# Patient Record
Sex: MALE | Race: WHITE | NOT HISPANIC OR LATINO | Employment: PART TIME | URBAN - METROPOLITAN AREA
[De-identification: names, ages, dates, MRNs, and addresses within clinical notes are randomized per-mention and may not be internally consistent; named-entity substitution may affect disease eponyms.]

---

## 2019-03-07 ENCOUNTER — THERAPY VISIT (OUTPATIENT)
Dept: PHYSICAL THERAPY | Facility: CLINIC | Age: 64
End: 2019-03-07
Payer: COMMERCIAL

## 2019-03-07 DIAGNOSIS — M54.50 BILATERAL LOW BACK PAIN WITHOUT SCIATICA: ICD-10-CM

## 2019-03-07 PROCEDURE — 97140 MANUAL THERAPY 1/> REGIONS: CPT | Mod: GP | Performed by: PHYSICAL THERAPIST

## 2019-03-07 PROCEDURE — 97161 PT EVAL LOW COMPLEX 20 MIN: CPT | Mod: GP | Performed by: PHYSICAL THERAPIST

## 2019-03-07 PROCEDURE — 97110 THERAPEUTIC EXERCISES: CPT | Mod: GP | Performed by: PHYSICAL THERAPIST

## 2019-03-07 NOTE — LETTER
[facility]  [address]  [address]  [place] [address]  [phone]    2019    Re: Nir Mendenhall   :   1955  MRN:  [id number]   REFERRING PHYSICIAN:   Aiden Salguero    [facility]    Date of Initial Evaluation: 3/7/19  Visits:  Rxs Used: 1  Reason for Referral:  Bilateral low back pain without sciatica    EVALUATION SUMMARY    [facility] Initial Evaluation  Subjective:  The history is provided by the patient. No  was used.   Nir Mendenhall is a 63 year old male with a lumbar condition.  Condition occurred with:  Repetition/overuse.  Condition occurred: for unknown reasons.  This is a chronic condition  2019.  Patient reports pain:  Central lumbar spine.    Pain is described as aching and is intermittent and reported as 4/10.  Associated symptoms:  Loss of motion/stiffness and loss of strength. Pain is worse in the P.M. (difficulty sleeping due to back pain).  Symptoms are exacerbated by lifting, walking and standing and relieved by rest.  General health as reported by patient is good.  Pertinent medical history includes:  None.  Medical allergies: no.  Other surgeries include:  Other.  Current medications:  None as reported by the patient.  Current occupation is Pt works in [employer],  the cost..  Patient is working in normal job with restrictions.      Barriers include:  None as reported by the patient.  Red flags:  None as reported by the patient.  Goal: decreased back pain during sleeping                      [place] Lumbar Evaluation    Posture:  Sitting: poor  Standing: poor  Lordosis: Reduced  Lateral Shift: no  Correction of Posture: better    Movement Loss:  Flexion (Flex): min  Extension (EXT): min         Nir Mendenhall   :   1955      Test Movements:  FIS: During: no effect  After: no effect    Repeat FIS: During: no effect  After: no effect   Mechanical Response: IncROM  EIS: During: no effect  After: no effect    Repeat EIS: During: no effect  After: no effect  Mechanical Response: no effect  TALI: During: decreases  After: better    Repeat TALI: During: decreases  After: better      Conclusion: derangement  Principle of Treatment:  Flexion: pt responding to repeated flexion of the lumbar spine. encouraged to perform flexion in sitting stretch every 2-3 hours during the day. use of lumbar roll to improve posture when sitting.    Assessment/Plan:    Patient is a 63 year old male with lumbar complaints.    Patient has the following significant findings with corresponding treatment plan.                Diagnosis 1:  Low Back Pain, Chronic  Pain -  hot/cold therapy, manual therapy, self management, education, directional preference exercise and home program  Decreased ROM/flexibility - manual therapy and therapeutic exercise  Decreased joint mobility - manual therapy and therapeutic exercise  Impaired posture - neuro re-education    Previous and current functional limitations:  (See Goal Flow Sheet for this information)    Short term and Long term goals: (See Goal Flow Sheet for this information)     Communication ability:  Patient appears to be able to clearly communicate and understand verbal and written communication and follow directions correctly.  Treatment Explanation - The following has been discussed with the patient:   RX ordered/plan of care  Anticipated outcomes  Possible risks and side effects  This patient would benefit from PT intervention to resume normal activities.   Rehab potential is good.    Frequency:  1 X week, once daily  Duration:  for 6 weeks  Discharge Plan:  Achieve all LTG.  Independent in home treatment program.  Reach maximal therapeutic benefit.    Thank you for your referral.    INQUIRIES  Therapist: Elizabeth Navarro, PT  [facility] [place] [facility]  [address]  [address]  [place]  [address]  Phone: [phone]  Fax: [phone]

## 2019-03-07 NOTE — PROGRESS NOTES
[facility] Initial Evaluation  Subjective:  The history is provided by the patient. No  was used.   Nir Mendenhall is a 63 year old male with a lumbar condition.  Condition occurred with:  Repetition/overuse.  Condition occurred: for unknown reasons.  This is a chronic condition  1/16/2019.    Patient reports pain:  Central lumbar spine.    Pain is described as aching and is intermittent and reported as 4/10.  Associated symptoms:  Loss of motion/stiffness and loss of strength. Pain is worse in the P.M. (difficulty sleeping due to back pain).  Symptoms are exacerbated by lifting, walking and standing and relieved by rest.          General health as reported by patient is good.  Pertinent medical history includes:  None.  Medical allergies: no.  Other surgeries include:  Other.  Current medications:  None as reported by the patient.  Current occupation is Pt works in [employer],  the cost..  Patient is working in normal job with restrictions.      Barriers include:  None as reported by the patient.    Red flags:  None as reported by the patient.    Goal: decreased back pain during sleeping                    Objective:  System    Physical Exam      [place] Lumbar Evaluation    Posture:  Sitting: poor  Standing: poor  Lordosis: Reduced  Lateral Shift: no  Correction of Posture: better    Movement Loss:  Flexion (Flex): min  Extension (EXT): min      Test Movements:  FIS: During: no effect  After: no effect    Repeat FIS: During: no effect  After: no effect  Mechanical Response: IncROM  EIS: During: no effect  After: no effect    Repeat EIS: During: no effect  After: no effect  Mechanical Response: no effect  TALI: During: decreases  After: better    Repeat TALI: During: decreases  After: better            Conclusion: derangement  Principle of Treatment:    Flexion: pt responding to repeated flexion of the lumbar spine. encouraged to perform flexion in sitting stretch  every 2-3 hours during the day. use of lumbar roll to improve posture when sitting.                                             ROS    Assessment/Plan:    Patient is a 63 year old male with lumbar complaints.    Patient has the following significant findings with corresponding treatment plan.                Diagnosis 1:  Low Back Pain, Chronic  Pain -  hot/cold therapy, manual therapy, self management, education, directional preference exercise and home program  Decreased ROM/flexibility - manual therapy and therapeutic exercise  Decreased joint mobility - manual therapy and therapeutic exercise  Impaired posture - neuro re-education    1) Clinical presentation characteristics are:   Stable/Uncomplicated.  2) Decision-Making    Low complexity using standardized patient assessment instrument and/or measureable assessment of functional outcome.  Cumulative Therapy Evaluation is: Low complexity.    Previous and current functional limitations:  (See Goal Flow Sheet for this information)    Short term and Long term goals: (See Goal Flow Sheet for this information)     Communication ability:  Patient appears to be able to clearly communicate and understand verbal and written communication and follow directions correctly.  Treatment Explanation - The following has been discussed with the patient:   RX ordered/plan of care  Anticipated outcomes  Possible risks and side effects  This patient would benefit from PT intervention to resume normal activities.   Rehab potential is good.    Frequency:  1 X week, once daily  Duration:  for 6 weeks  Discharge Plan:  Achieve all LTG.  Independent in home treatment program.  Reach maximal therapeutic benefit.    Please refer to the daily flowsheet for treatment today, total treatment time and time spent performing 1:1 timed codes.

## 2019-05-06 PROBLEM — M54.50 BILATERAL LOW BACK PAIN WITHOUT SCIATICA: Status: RESOLVED | Noted: 2019-03-07 | Resolved: 2019-05-06

## 2019-05-06 NOTE — PROGRESS NOTES
Pt last seen in PT 03/07/2019.  Prior to being seen, he had multiple short notice cancellations and and a no show.  Since being seen, he has no showed again with no further appts scheduled.  Consider note dated 03/07 to serve as final summary.

## 2019-07-01 ENCOUNTER — ANCILLARY PROCEDURE (OUTPATIENT)
Dept: GENERAL RADIOLOGY | Facility: CLINIC | Age: 64
End: 2019-07-01
Attending: PEDIATRICS
Payer: COMMERCIAL

## 2019-07-01 ENCOUNTER — OFFICE VISIT (OUTPATIENT)
Dept: ORTHOPEDICS | Facility: CLINIC | Age: 64
End: 2019-07-01
Payer: COMMERCIAL

## 2019-07-01 VITALS
DIASTOLIC BLOOD PRESSURE: 82 MMHG | WEIGHT: 203 LBS | BODY MASS INDEX: 30.77 KG/M2 | SYSTOLIC BLOOD PRESSURE: 118 MMHG | HEIGHT: 68 IN

## 2019-07-01 DIAGNOSIS — R07.81 RIB PAIN ON RIGHT SIDE: ICD-10-CM

## 2019-07-01 DIAGNOSIS — R07.81 RIB PAIN ON RIGHT SIDE: Primary | ICD-10-CM

## 2019-07-01 PROCEDURE — 71101 X-RAY EXAM UNILAT RIBS/CHEST: CPT | Mod: RT

## 2019-07-01 PROCEDURE — 99203 OFFICE O/P NEW LOW 30 MIN: CPT | Performed by: PEDIATRICS

## 2019-07-01 RX ORDER — ESCITALOPRAM OXALATE 20 MG/1
TABLET ORAL
COMMUNITY

## 2019-07-01 RX ORDER — METOPROLOL TARTRATE 25 MG/1
TABLET, FILM COATED ORAL
Refills: 3 | COMMUNITY
Start: 2019-01-16

## 2019-07-01 ASSESSMENT — MIFFLIN-ST. JEOR: SCORE: 1685.3

## 2019-07-01 NOTE — PROGRESS NOTES
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 He was carrying some items out of the garage and he tripped and fell onto it yesterday. Pain over the right lower ribs. He states he has had difficulty with deep breathing.  Sleeping was difficult due to being unable to get comfortable.  No abdominal pain, no difficulty breathing.    Injury: fall onto object on right side     Location of Pain: right, side ribs   Duration of Pain: 1 day(s)  Rating of Pain at worst: 9/10  Rating of Pain Currently: 5/10  Symptoms are better with: rest  Symptoms are worse with: twisting, deep breathing, coughing   Additional Features:   Positive: none   Negative: swelling, bruising, popping, grinding, catching, locking, instability, paresthesias, numbness, weakness, pain in other joints and systemic symptoms  Other evaluation and/or treatments so far consists of: nothing  Prior History of related problems: denies     Social History:  for construction company     Review of Systems  Skin: no bruising, no swelling  Musculoskeletal: as above  Neurologic: no numbness, paresthesias  Remainder of review of systems is negative including constitutional, CV, pulmonary, GI, except as noted in HPI or medical history.    Patient's current problem list, past medical and surgical history, and family history were reviewed.    Patient Active Problem List   Diagnosis   (none) - all problems resolved or deleted     Past Medical History:   Diagnosis Date     Congestive heart failure, unspecified      Past Surgical History:   Procedure Laterality Date     CARDIAC SURGERY  September 2008    angioplasty     HERNIA REPAIR  1978     ORTHOPEDIC SURGERY  1998    torn maniscus     Objective  /82   Ht 1.727 m (5' 8\")   Wt 92.1 kg (203 lb)   BMI 30.87 kg/m      GENERAL APPEARANCE: healthy, alert and no distress   GAIT: " NORMAL  SKIN: no suspicious lesions or rashes  HEENT: Sclera clear, anicteric  CV: good peripheral pulses  RESP: Breathing not labored  NEURO: Normal strength and tone, mentation intact and speech normal  PSYCH:  mentation appears normal and affect normal/bright    Chest/Thoracic exam:  Inspection:     no visible deformity, swelling, bruising    Tender:     Right anterior ribs    Lungs: CTAB without wheezine  Abdomen: soft, non tender    Radiology  I ordered, visualized and reviewed these images with the patient  Xr Ribs & Chest Rt G/e 3vw  Result Date: 7/1/2019  RIGHT RIBS AND CHEST, THREE VIEWS 7/1/2019 12:52 PM HISTORY: Rib pain on right side. COMPARISON: None.   IMPRESSION: PA chest shows a small amount of platelike atelectasis or scarring in the lateral left lung base, and the remainder of the lungs are clear. Tortuous thoracic aorta and normal heart size. Right rib detail views are unremarkable.    Assessment:  1. Rib pain on right side      We discussed these other possible diagnosis: right rib contusion.  Can not rule out non-displaced rib fracture.  Discussed supportive care including rest, ice, OTC medications (reviewed dosing, do not take Ibuprofen with Naproxen).  Follow up in 3-4 weeks for persistent symptoms, could consider repeat x-rays to evaluate for fracture healing.  Could also consider physical therapy for persistent pain.  - Recommend follow up with PCP given CXR read of atelectasis/scarring lung base.  - Discussed concerning signs and symptoms that would warrant urgent care or ER evaluations.    Plan:  - Today's Plan of Care:  Referral to primary care following the radiologist report on the chest x ray.   Topical Treatments: Ice or Heat, begin with ice.  Ok to alternate to heat after a few days if needed  Over the counter medication: Acetaminophen (Tylenol) 1000mg every 6 hours with food (Maximum of 3000mg/day)  Ibuprofen (Advil) maximum of 800mg three times a day with food  Naproxen (Aleve)  maximum of 440mg two times a day with food  Observe and monitor    -We also discussed other future treatment options:  X ray of the chest or Rehab: Physical Therapy    Follow Up: 3-6 weeks or PRN    Concerning signs and symptoms were reviewed.  The patient expressed understanding of this management plan and all questions were answered at this time.    Linda Delvalle MD CAQ  Primary Care Sports Medicine  [facility]

## 2019-07-01 NOTE — LETTER
7/1/2019         RE: Nir Mendenhall  [address] [place] [address]        Dear Colleague,    Thank you for referring your patient, Nir Mendenhall, to the [facility] [place]. Please see a copy of my visit note below.    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 He was carrying some items out of the garage and he tripped and fell onto it yesterday. Pain over the right lower ribs. He states he has had difficulty with deep breathing.  Sleeping was difficult due to being unable to get comfortable.  No abdominal pain, no difficulty breathing.    Injury: fall onto object on right side     Location of Pain: right, side ribs   Duration of Pain: 1 day(s)  Rating of Pain at worst: 9/10  Rating of Pain Currently: 5/10  Symptoms are better with: rest  Symptoms are worse with: twisting, deep breathing, coughing   Additional Features:   Positive: none   Negative: swelling, bruising, popping, grinding, catching, locking, instability, paresthesias, numbness, weakness, pain in other joints and systemic symptoms  Other evaluation and/or treatments so far consists of: nothing  Prior History of related problems: denies     Social History:  for construction company     Review of Systems  Skin: no bruising, no swelling  Musculoskeletal: as above  Neurologic: no numbness, paresthesias  Remainder of review of systems is negative including constitutional, CV, pulmonary, GI, except as noted in HPI or medical history.    Patient's current problem list, past medical and surgical history, and family history were reviewed.    Patient Active Problem List   Diagnosis   (none) - all problems resolved or deleted     Past Medical History:   Diagnosis Date     Congestive heart failure, unspecified      Past Surgical History:   Procedure Laterality Date     CARDIAC  "SURGERY  September 2008    angioplasty     HERNIA REPAIR  1978     ORTHOPEDIC SURGERY  1998    torn maniscus     Objective  /82   Ht 1.727 m (5' 8\")   Wt 92.1 kg (203 lb)   BMI 30.87 kg/m       GENERAL APPEARANCE: healthy, alert and no distress   GAIT: NORMAL  SKIN: no suspicious lesions or rashes  HEENT: Sclera clear, anicteric  CV: good peripheral pulses  RESP: Breathing not labored  NEURO: Normal strength and tone, mentation intact and speech normal  PSYCH:  mentation appears normal and affect normal/bright    Chest/Thoracic exam:  Inspection:     no visible deformity, swelling, bruising    Tender:     Right anterior ribs    Lungs: CTAB without wheezine  Abdomen: soft, non tender    Radiology  I ordered, visualized and reviewed these images with the patient  Xr Ribs & Chest Rt G/e 3vw  Result Date: 7/1/2019  RIGHT RIBS AND CHEST, THREE VIEWS 7/1/2019 12:52 PM HISTORY: Rib pain on right side. COMPARISON: None.   IMPRESSION: PA chest shows a small amount of platelike atelectasis or scarring in the lateral left lung base, and the remainder of the lungs are clear. Tortuous thoracic aorta and normal heart size. Right rib detail views are unremarkable.    Assessment:  1. Rib pain on right side      We discussed these other possible diagnosis: right rib contusion.  Can not rule out non-displaced rib fracture.  Discussed supportive care including rest, ice, OTC medications (reviewed dosing, do not take Ibuprofen with Naproxen).  Follow up in 3-4 weeks for persistent symptoms, could consider repeat x-rays to evaluate for fracture healing.  Could also consider physical therapy for persistent pain.  - Recommend follow up with PCP given CXR read of atelectasis/scarring lung base.    Plan:  - Today's Plan of Care:  Referral to primary care following the radiologist report on the chest x ray.   Topical Treatments: Ice or Heat, begin with ice.  Ok to alternate to heat after a few days if needed  Over the counter " medication: Acetaminophen (Tylenol) 1000mg every 6 hours with food (Maximum of 3000mg/day)  Ibuprofen (Advil) maximum of 800mg three times a day with food  Naproxen (Aleve) maximum of 440mg two times a day with food  Observe and monitor    -We also discussed other future treatment options:  X ray of the chest or Rehab: Physical Therapy    Follow Up: 3-6 weeks or PRN    Concerning signs and symptoms were reviewed.  The patient expressed understanding of this management plan and all questions were answered at this time.    Linda Delvalle MD CAQ  Primary Care Sports Medicine  [facility]    Again, thank you for allowing me to participate in the care of your patient.        Sincerely,        Linda Delvalle MD

## 2019-07-01 NOTE — PATIENT INSTRUCTIONS
We discussed these other possible diagnosis: right rib contusion     Plan:  - Today's Plan of Care:  Referral to primary care following the radiologist report on the chest x ray.   Topical Treatments: Ice or Heat, begin with ice.  Ok to alternate to heat after a few days if needed  Over the counter medication: Acetaminophen (Tylenol) 1000mg every 6 hours with food (Maximum of 3000mg/day)  Ibuprofen (Advil) maximum of 800mg three times a day with food  Naproxen (Aleve) maximum of 440mg two times a day with food  Observe and monitor    -We also discussed other future treatment options:  X ray of the chest or Rehab: Physical Therapy    Follow Up: 3-6 weeks or PRN    If you have any further questions for your physician or physician s care team you can call [phone] and use option 3 to leave a voice message. Calls received during business hours will be returned same day.

## 2019-07-02 ENCOUNTER — TELEPHONE (OUTPATIENT)
Dept: ORTHOPEDICS | Facility: CLINIC | Age: 64
End: 2019-07-02

## 2019-07-02 NOTE — TELEPHONE ENCOUNTER
Patient called.  Asking that images taken yesterday be sent to his primary care provider.    Called to discuss with patient.  Asked what clinic/hospital his provider is located at.  He sees someone at [facility].  He believes they are affiliated with [facility].  He will call back with phone number to see if we can push images.  Ellen Dia MS ATC

## 2019-07-02 NOTE — TELEPHONE ENCOUNTER
Patient called back.  Office number for Dr. Salguero is .   Called office, they are unable to accept x rays thru their system.  Called patient to let him know that our X ray staff will make a CD and leave it at the  for .      X ray CD will be left at .  Ellen Dia MS ATC

## 2021-06-18 ENCOUNTER — RECORDS - HEALTHEAST (OUTPATIENT)
Dept: ADMINISTRATIVE | Facility: CLINIC | Age: 66
End: 2021-06-18

## 2024-05-16 ENCOUNTER — OFFICE VISIT (OUTPATIENT)
Dept: PEDIATRICS | Facility: CLINIC | Age: 69
End: 2024-05-16
Payer: COMMERCIAL

## 2024-05-16 ENCOUNTER — ANCILLARY PROCEDURE (OUTPATIENT)
Dept: ULTRASOUND IMAGING | Facility: CLINIC | Age: 69
End: 2024-05-16
Attending: PHYSICIAN ASSISTANT
Payer: COMMERCIAL

## 2024-05-16 ENCOUNTER — OFFICE VISIT (OUTPATIENT)
Dept: URGENT CARE | Facility: URGENT CARE | Age: 69
End: 2024-05-16
Payer: COMMERCIAL

## 2024-05-16 VITALS
OXYGEN SATURATION: 97 % | RESPIRATION RATE: 16 BRPM | TEMPERATURE: 98.5 F | SYSTOLIC BLOOD PRESSURE: 164 MMHG | HEART RATE: 67 BPM | WEIGHT: 208 LBS | BODY MASS INDEX: 31.52 KG/M2 | HEIGHT: 68 IN | DIASTOLIC BLOOD PRESSURE: 119 MMHG

## 2024-05-16 VITALS
RESPIRATION RATE: 16 BRPM | BODY MASS INDEX: 31.63 KG/M2 | TEMPERATURE: 98.5 F | WEIGHT: 208 LBS | SYSTOLIC BLOOD PRESSURE: 169 MMHG | OXYGEN SATURATION: 95 % | DIASTOLIC BLOOD PRESSURE: 108 MMHG | HEART RATE: 75 BPM

## 2024-05-16 DIAGNOSIS — M71.21 BAKER'S CYST OF KNEE, RIGHT: Primary | ICD-10-CM

## 2024-05-16 DIAGNOSIS — M79.89 RIGHT LEG SWELLING: Primary | ICD-10-CM

## 2024-05-16 DIAGNOSIS — I10 HYPERTENSION, UNSPECIFIED TYPE: ICD-10-CM

## 2024-05-16 DIAGNOSIS — R22.41 LOCALIZED SWELLING OF RIGHT LOWER LEG: ICD-10-CM

## 2024-05-16 DIAGNOSIS — M25.561 ACUTE PAIN OF RIGHT KNEE: ICD-10-CM

## 2024-05-16 LAB
ALBUMIN UR-MCNC: NEGATIVE MG/DL
ANION GAP SERPL CALCULATED.3IONS-SCNC: 10 MMOL/L (ref 7–15)
APPEARANCE UR: CLEAR
BACTERIA #/AREA URNS HPF: ABNORMAL /HPF
BILIRUB UR QL STRIP: NEGATIVE
BUN SERPL-MCNC: 9.3 MG/DL (ref 8–23)
CALCIUM SERPL-MCNC: 9.6 MG/DL (ref 8.8–10.2)
CHLORIDE SERPL-SCNC: 102 MMOL/L (ref 98–107)
COLOR UR AUTO: YELLOW
CREAT SERPL-MCNC: 0.91 MG/DL (ref 0.67–1.17)
DEPRECATED HCO3 PLAS-SCNC: 27 MMOL/L (ref 22–29)
EGFRCR SERPLBLD CKD-EPI 2021: >90 ML/MIN/1.73M2
GLUCOSE SERPL-MCNC: 99 MG/DL (ref 70–99)
GLUCOSE UR STRIP-MCNC: NEGATIVE MG/DL
HGB UR QL STRIP: NEGATIVE
KETONES UR STRIP-MCNC: NEGATIVE MG/DL
LEUKOCYTE ESTERASE UR QL STRIP: ABNORMAL
NITRATE UR QL: NEGATIVE
PH UR STRIP: 7 [PH] (ref 5–7)
POTASSIUM SERPL-SCNC: 4.9 MMOL/L (ref 3.4–5.3)
RBC #/AREA URNS AUTO: ABNORMAL /HPF
SODIUM SERPL-SCNC: 139 MMOL/L (ref 135–145)
SP GR UR STRIP: 1.01 (ref 1–1.03)
UROBILINOGEN UR STRIP-ACNC: 0.2 E.U./DL
WBC #/AREA URNS AUTO: ABNORMAL /HPF

## 2024-05-16 PROCEDURE — 99204 OFFICE O/P NEW MOD 45 MIN: CPT | Performed by: PHYSICIAN ASSISTANT

## 2024-05-16 PROCEDURE — 93971 EXTREMITY STUDY: CPT | Mod: RT

## 2024-05-16 PROCEDURE — 36415 COLL VENOUS BLD VENIPUNCTURE: CPT | Performed by: PHYSICIAN ASSISTANT

## 2024-05-16 PROCEDURE — 87086 URINE CULTURE/COLONY COUNT: CPT | Performed by: PHYSICIAN ASSISTANT

## 2024-05-16 PROCEDURE — 80048 BASIC METABOLIC PNL TOTAL CA: CPT | Performed by: PHYSICIAN ASSISTANT

## 2024-05-16 PROCEDURE — 99207 REFERRAL TO ACUTE AND DIAGNOSTIC SERVICES: CPT | Performed by: EMERGENCY MEDICINE

## 2024-05-16 PROCEDURE — 81001 URINALYSIS AUTO W/SCOPE: CPT | Performed by: PHYSICIAN ASSISTANT

## 2024-05-16 NOTE — PROGRESS NOTES
Acute and Diagnostic Services Clinic Visit    {PROVIDER CHARTING PREFERENCE:480190}    Noah Earl is a 68 year old, presenting for the following health issues:  No chief complaint on file.    HPI     Evaluation for possible DVT  Onset/Duration: 1 week  Description:       Location: right calf and knee       Redness: no        Pain: mild       Warmth: no        Joint swelling YES  Progression of symptoms worse  Accompanying signs and symptoms:       Fevers: no        Numbness/tingling/weakness: no        Chest pain/pleurisy: no        Shortness of breath: no   History        Trauma: no         Recent travel/when: no         Previous history of DVT: no         Family history of DVT: no         Recent surgery: no   Aggravating factors include: none  Therapies tried and outcome: nothing  Prior surgery on arteries of veins in this area: No  {Link to age adjusted D-dimer (UTD)   :424561}      {ROS Picklists (Optional):112794}      Objective    There were no vitals taken for this visit.  There is no height or weight on file to calculate BMI.  Physical Exam   {Exam List (Optional):326117}    {Diagnostic Test Results (Optional):965486}        Signed Electronically by: Lela Gan PA-C  {Email feedback regarding this note to [email or web address]   :[id number]}

## 2024-05-16 NOTE — PATIENT INSTRUCTIONS
1) Take Tylenol up to 1000 mg four times a day for pain as needed   2) Ice to the affected area 20 minutes three times daily.  3) Elevate the affected area above the level of the heart when you are able to.   4) Compress the affected area using an ACE wrap or brace. This will help to keep swelling down and helps with pain relief.   5) Follow up with ortho if not improving, sooner if worsening.     Follow up with primary care regarding your elevated blood pressure, within the next week.

## 2024-05-16 NOTE — PROGRESS NOTES
Assessment & Plan     Diagnosis:    ICD-10-CM    1. Right leg swelling  M79.89       2. Acute pain of right knee  M25.561           Medical Decision Making:  Nir Mendenhall is a 68 year old male who presents for evaluation of leg swelling. A broad differential was considered including Baker's cyst rupture, Baker's cyst, hematoma, rupture, compartment syndrome, muscle rupture, DVT, superficial thrombophlebitis, compression of the venous structures higher up in the abdomen and or leg cellulitis/ abscess, etc. given limitation of workup in urgent care, patient was directed to go to the [facility] now for further evaluation to rule out more nefarious causes of right leg swelling as noted above.  Questions answered.      Referral to Acute and Diagnostic Services    [phone] ([place]) [place] [address] [facility], [address], [place] [address]    Transition to Acute & Diagnostic Services Clinic has been discussed with patient, and he agrees with next level of care.   Patient understands that evaluation/treatment at [facility] typically takes significantly longer than in clinic/urgent care (>2 hours).  The [facility]ostics [facility] has been contacted by provider/staff to confirm patient acceptance.         Special issues:      None                                     Joe Spear PA-C  [facility]    Subjective     Nir Mendenhall is a 68 year old male who presents to clinic today for the following health issues:  Chief Complaint   Patient presents with    Urgent Care    Musculoskeletal Problem     Per patient states [place] the next day he noticed right knee pain and swelling now having leg double it's size        HPI    He states that over the [place] and he had some right knee pain, so this is getting progressively more swollen now his calf is more painful as well.  States that  the leg is now doubled in size in the calf, his daughter is a PA and they are concerned about a blood clot.  Has any chest pain or shortness of breath, recent surgeries, history of DVT/PE or other symptoms.    Review of Systems    See HPI    Objective      Vitals: BP (!) 169/108   Pulse 75   Temp 98.5  F (36.9  C) (Tympanic)   Resp 16   Wt 94.3 kg (208 lb)   SpO2 95%   BMI 31.63 kg/m      Patient Vitals for the past 24 hrs:   BP Temp Temp src Pulse Resp SpO2 Weight   05/16/24 1032 (!) 169/108 98.5  F (36.9  C) Tympanic 75 16 95 % 94.3 kg (208 lb)       Vital signs reviewed by: Joe Spear PA-C    Physical Exam   Constitutional: Patient is alert and cooperative. No acute distress.  Cardiovascular: Regular rate and rhythm  Pulmonary/Chest: Lungs are clear to auscultation throughout. Effort normal. No respiratory distress. No wheezes, rales or rhonchi.  Neurological: Alert and oriented x3.   MSK/Skin: Right leg with 3+ pitting edema from the knee down.  Knee appears locally swollen as well.  Tenderness in the calf and popliteal region.  No popliteal pulsatile mass.  Range of motion limited secondary to pain at the knee.  No ligamentous instability.   Psychiatric:The patient has a normal mood and affect.       Joe Spear PA-C, May 16, 2024

## 2024-05-16 NOTE — PROGRESS NOTES
Assessment/Plan:    BP elevated today @ 165/118. Labs checked and no evidence of renal insufficiency, or other symptoms concerning for end organ damage. Advised follow up with PCP regarding HTN management.     RLE is NVI, no erythema or fever/chills to suggest infection. Ultrasound negative for DVT. Arias's cyst present, and likely etiology of patient's symptoms. Suspect arthritis vs knee sprain/internal derangement as well. Advised follow up with ortho; referral placed. RICE and Tylenol PRN also recommended; he has a knee compression sleeve at home he can use.    See patient instructions below.    At the end of the encounter, I discussed results, diagnosis, medications. Discussed red flags for immediate return to clinic/ER, as well as indications for follow up if no improvement. Patient understood and agreed to plan. Patient was stable for discharge.      ICD-10-CM    1. Baker's cyst of knee, right  M71.21 Orthopedic  Referral      2. Hypertension, unspecified type  I10 Basic metabolic panel     UA with Microscopic reflex to Culture     Basic metabolic panel     UA with Microscopic reflex to Culture     UA Microscopic with Reflex to Culture      3. Localized swelling of right lower leg  R22.41 US Lower Extremity Venous Duplex Right            Return in about 1 week (around 5/23/2024) for follow up with ortho if not improving.    KRISTY Castro, PAANA  [facility]  -----------------------------------------------------------------------------------------------------------------------------------------------------    HPI:  Nir Mendenhall is a 68 year old male with history of CAD and HTN who presents for evaluation of the following:    Evaluation for possible DVT  Onset/Duration: 1 week  Description:       Location: right calf and knee       Redness: no        Pain: mild       Warmth: no        Joint swelling YES  Progression of symptoms worse  Accompanying signs and  "symptoms:       Fevers: no        Numbness/tingling/weakness: no        Chest pain/pleurisy: no        Shortness of breath: no   History        Trauma: no         Recent travel/when: no         Previous history of DVT: no         Family history of DVT: no         Recent surgery: no   Aggravating factors include: none  Therapies tried and outcome: nothing  Prior surgery on arteries of veins in this area: No    He states that [place] and he had some right knee pain, so this is getting progressively more swollen now his calf is more painful as well. States that the leg is now doubled in size in the calf, his daughter is a PA and they are concerned about a blood clot. Has not had any chest pain or shortness of breath, recent surgeries, history of DVT/PE or other symptoms.     Hx of HTN, is on metoprolol. Goes to private primary care office in [place]. States his BP is usually well controlled. Denies vision changes, HA, CP, SOB, abd pain, decreased urination.    Past Medical History:   Diagnosis Date    Congestive heart failure, unspecified        Vitals:    05/16/24 1150 05/16/24 1417 05/16/24 1418   BP: (!) 165/118 (!) 166/106 (!) 164/119   BP Location: Right arm Right arm Left arm   Patient Position: Sitting Sitting Sitting   Cuff Size: Adult Regular Adult Regular Adult Regular   Pulse: 67     Resp: 16     Temp: 98.5  F (36.9  C)     SpO2: 97%     Weight: 94.3 kg (208 lb)     Height: 1.727 m (5' 8\")         Physical Exam  Vitals and nursing note reviewed.   Cardiovascular:      Pulses:           Posterior tibial pulses are 3+ on the right side.   Pulmonary:      Effort: Pulmonary effort is normal.   Musculoskeletal:      Right knee: Effusion present. Normal range of motion. No tenderness.      Right lower leg: Swelling and tenderness present. No edema.        Legs:    Neurological:      Mental Status: He is alert.         Labs/Imaging:  Results for orders placed or " performed in visit on 05/16/24 (from the past 24 hour(s))   Basic metabolic panel   Result Value Ref Range    Sodium 139 135 - 145 mmol/L    Potassium 4.9 3.4 - 5.3 mmol/L    Chloride 102 98 - 107 mmol/L    Carbon Dioxide (CO2) 27 22 - 29 mmol/L    Anion Gap 10 7 - 15 mmol/L    Urea Nitrogen 9.3 8.0 - 23.0 mg/dL    Creatinine 0.91 0.67 - 1.17 mg/dL    GFR Estimate >90 >60 mL/min/1.73m2    Calcium 9.6 8.8 - 10.2 mg/dL    Glucose 99 70 - 99 mg/dL   UA with Microscopic reflex to Culture    Specimen: Urine, Midstream   Result Value Ref Range    Color Urine Yellow Colorless, Straw, Light Yellow, Yellow    Appearance Urine Clear Clear    Glucose Urine Negative Negative mg/dL    Bilirubin Urine Negative Negative    Ketones Urine Negative Negative mg/dL    Specific Gravity Urine 1.015 1.003 - 1.035    Blood Urine Negative Negative    pH Urine 7.0 5.0 - 7.0    Protein Albumin Urine Negative Negative mg/dL    Urobilinogen Urine 0.2 0.2, 1.0 E.U./dL    Nitrite Urine Negative Negative    Leukocyte Esterase Urine Trace (A) Negative   UA Microscopic with Reflex to Culture   Result Value Ref Range    Bacteria Urine Few (A) None Seen /HPF    RBC Urine 0-2 0-2 /HPF /HPF    WBC Urine 5-10 (A) 0-5 /HPF /HPF    Narrative    Urine Culture not indicated     No results found for this or any previous visit (from the past 24 hour(s)).    Results for orders placed or performed in visit on 05/16/24   US Lower Extremity Venous Duplex Right     Status: None    Narrative    EXAM: US LOWER EXTREMITY VENOUS DUPLEX RIGHT  LOCATION: [facility]  DATE: 5/16/2024    INDICATION: R knee pain swelling going into calf  COMPARISON: None.  TECHNIQUE: Venous Duplex ultrasound of the right lower extremity with and without compression, augmentation and duplex. Color flow and spectral Doppler with waveform analysis performed.    FINDINGS: Exam includes the common femoral, femoral, popliteal, and contralateral common femoral veins as  well as segmentally visualized deep calf veins and greater saphenous vein.     RIGHT: No deep vein thrombosis. No superficial thrombophlebitis. 3.6 x 2.5 x 0.3 cm Baker's cyst is seen along the popliteal fossa.      Impression    IMPRESSION:  1.  No deep venous thrombosis in the right lower extremity.  2.  3.6 x 2.5 x 0.3 cm Baker's cyst within the right popliteal fossa.   Results for orders placed or performed in visit on 05/16/24   Basic metabolic panel     Status: Normal   Result Value Ref Range    Sodium 139 135 - 145 mmol/L    Potassium 4.9 3.4 - 5.3 mmol/L    Chloride 102 98 - 107 mmol/L    Carbon Dioxide (CO2) 27 22 - 29 mmol/L    Anion Gap 10 7 - 15 mmol/L    Urea Nitrogen 9.3 8.0 - 23.0 mg/dL    Creatinine 0.91 0.67 - 1.17 mg/dL    GFR Estimate >90 >60 mL/min/1.73m2    Calcium 9.6 8.8 - 10.2 mg/dL    Glucose 99 70 - 99 mg/dL   UA with Microscopic reflex to Culture     Status: Abnormal    Specimen: Urine, Midstream   Result Value Ref Range    Color Urine Yellow Colorless, Straw, Light Yellow, Yellow    Appearance Urine Clear Clear    Glucose Urine Negative Negative mg/dL    Bilirubin Urine Negative Negative    Ketones Urine Negative Negative mg/dL    Specific Gravity Urine 1.015 1.003 - 1.035    Blood Urine Negative Negative    pH Urine 7.0 5.0 - 7.0    Protein Albumin Urine Negative Negative mg/dL    Urobilinogen Urine 0.2 0.2, 1.0 E.U./dL    Nitrite Urine Negative Negative    Leukocyte Esterase Urine Trace (A) Negative   UA Microscopic with Reflex to Culture     Status: Abnormal   Result Value Ref Range    Bacteria Urine Few (A) None Seen /HPF    RBC Urine 0-2 0-2 /HPF /HPF    WBC Urine 5-10 (A) 0-5 /HPF /HPF    Narrative    Urine Culture not indicated       Patient Instructions   1) Take Tylenol up to 1000 mg four times a day for pain as needed   2) Ice to the affected area 20 minutes three times daily.  3) Elevate the affected area above the level of the heart when you are able to.   4) Compress the  affected area using an ACE wrap or brace. This will help to keep swelling down and helps with pain relief.   5) Follow up with ortho if not improving, sooner if worsening.     Follow up with primary care regarding your elevated blood pressure, within the next week.

## 2024-05-18 LAB — BACTERIA UR CULT: NORMAL

## 2024-06-05 ENCOUNTER — ANCILLARY PROCEDURE (OUTPATIENT)
Dept: GENERAL RADIOLOGY | Facility: CLINIC | Age: 69
End: 2024-06-05
Attending: PEDIATRICS
Payer: COMMERCIAL

## 2024-06-05 ENCOUNTER — OFFICE VISIT (OUTPATIENT)
Dept: ORTHOPEDICS | Facility: CLINIC | Age: 69
End: 2024-06-05
Attending: PHYSICIAN ASSISTANT
Payer: COMMERCIAL

## 2024-06-05 VITALS — BODY MASS INDEX: 31.52 KG/M2 | WEIGHT: 208 LBS | HEIGHT: 68 IN

## 2024-06-05 DIAGNOSIS — M71.21 BAKER'S CYST OF KNEE, RIGHT: ICD-10-CM

## 2024-06-05 DIAGNOSIS — M17.11 PRIMARY OSTEOARTHRITIS OF RIGHT KNEE: Primary | ICD-10-CM

## 2024-06-05 PROCEDURE — 99203 OFFICE O/P NEW LOW 30 MIN: CPT | Performed by: PEDIATRICS

## 2024-06-05 PROCEDURE — 73562 X-RAY EXAM OF KNEE 3: CPT | Mod: TC | Performed by: RADIOLOGY

## 2024-06-05 NOTE — PROGRESS NOTES
ASSESSMENT & PLAN    Rajat was seen today for pain.    Diagnoses and all orders for this visit:    Primary osteoarthritis of right knee    Baker's cyst of knee, right  -     XR Knee Standing AP [place] Bilat Lat Right; Future  -     Orthopedic  Referral        See Patient Instructions  Patient Instructions   X-rays of the right knee today demonstrate mild underlying degenerative change, or some arthritis in the knee.  This appears to coincide with the medial knee pain.  There is a joint effusion (knee joint swelling) on exam today, and the previous ultrasound evaluation demonstrated a Baker's cyst.  Swelling can subside with time.  For the knee, we discussed symptom management with icing, over-the-counter medication.  Activity modification reviewed.  Generally okay to be active if comfortable and able to do so.  No additional imaging required currently.  Discussed potential for rehab exercises, to keep the joint moving and muscle strong.  If interested in this, contact clinic.  For support, okay to continue with the compression that you have.  We also discussed potential for anti-inflammatory medications and steroid injection.  Defer these for now, with overall comfort level, plan for monitoring.  Ok to continue with monitoring, and we can leave follow-up open ended.    If you have any further questions for your physician or physician s care team you can contact them thru [employer] or by calling [phone].      Adama Layne [facility]      CC: Lela Gan      -----  Chief Complaint   Patient presents with    Right Knee - Pain       SUBJECTIVE  Nir Mendenhall is a/an 68 year old male who is seen in consultation at the request of  Lela Gan PA-C for evaluation of right knee.     The patient is seen by themselves.    Onset: 1 month(s) ago. Reports insidious onset without acute precipitating event. Notes that he was camping up [place]  "[place], uneven ground, and getting in and out of fishing boats  Location of Pain: right knee, diffuse around the knee and into the right lower leg  Worsened by: Turning the leg in bed  Better with:   Treatments tried: Elevation and icing, compression sleeves  Associated symptoms: swelling, popping/clicking    Orthopedic/Surgical history: None for the right knee  Meniscal Repair on the left side 20+ years ago  Social History/Occupation: Retired, working on a cabin    **  Above information per rooming staff.  Additional history:  Initially noted right medial knee pain. That pain has been waxing/waning.  Also noted right knee swelling day 1, then next day noted swelling into right lower leg.  Mild improvement in swelling.  Has noted some pop sensation on stairs.  Trying not to limp.        REVIEW OF SYSTEMS:  Review of Systems    OBJECTIVE:  Ht 1.727 m (5' 8\")   Wt 94.3 kg (208 lb)   BMI 31.63 kg/m           Right Knee exam    Inspection:   mild effusion   no ecchymosis    ROM:      Full active and passive ROM with flexion and extension    Patellar Motion:      Normal patellar tracking noted through range of motion    Tender:      none focal    Special Tests:      neg (-) Lachman       neg (-) anterior drawer       neg (-) posterior drawer       neg (-) varus       neg (-) valgus       no pain with forced extension        RADIOLOGY:  Final results and radiologist's interpretation, available in the [facility] health record.  Images were reviewed with the patient in the office today.  My personal interpretation of the performed imaging: mild bilateral medial compartment narrowing. No acute bony abnormality noted. + right knee effusion.      XR KNEE STANDING AP [place] BILAT LAT RIGHT 6/5/2024 1:22 PM      HISTORY: Chronic right knee pain, suspect bakers cyst, rule out  arthritis; Baker's cyst of knee, right     COMPARISON: None.                                                                       IMPRESSION: Mild " degenerative narrowing medial compartment of both  knees. Both knees negative for fracture. No significant effusion.     SHANNAN HAWKINS MD     ========================    Previous US:    US Lower Extremity Venous Duplex Right    Narrative    EXAM: US LOWER EXTREMITY VENOUS DUPLEX RIGHT  LOCATION: [facility]  DATE: 5/16/2024    INDICATION: R knee pain swelling going into calf  COMPARISON: None.  TECHNIQUE: Venous Duplex ultrasound of the right lower extremity with and without compression, augmentation and duplex. Color flow and spectral Doppler with waveform analysis performed.    FINDINGS: Exam includes the common femoral, femoral, popliteal, and contralateral common femoral veins as well as segmentally visualized deep calf veins and greater saphenous vein.     RIGHT: No deep vein thrombosis. No superficial thrombophlebitis. 3.6 x 2.5 x 0.3 cm Baker's cyst is seen along the popliteal fossa.      Impression    IMPRESSION:  1.  No deep venous thrombosis in the right lower extremity.  2.  3.6 x 2.5 x 0.3 cm Baker's cyst within the right popliteal fossa.           Review of prior external note(s) from - IM, UC  Review of the result(s) of each unique test - imaging  Independent interpretation of a test performed by another physician/other qualified health care professional (not separately reported) - imaging  Ordering of each unique test

## 2024-06-05 NOTE — Clinical Note
"2024      Nir Mendenhall  [id number]  [place] [address]      Dear Colleague,    Thank you for referring your patient, Nir Mendenhall, to the [facility]. Please see a copy of my visit note below.    ASSESSMENT & PLAN    Rajat was seen today for pain.    Diagnoses and all orders for this visit:    Baker's cyst of knee, right  -     XR Knee Standing AP [place] Bilat Lat Right; Future  -     Orthopedic  Referral      This issue is {ACUTE/CHRONIC:079206} and {IMPROVING WORSENIN}.      {FOLLOW UP PLANS (Optional):460286}    Adama Layne DO  [facility]      CC: Lela Gan      -----  Chief Complaint   Patient presents with    Right Knee - Pain       SUBJECTIVE  Nir Mendenhall is a/an 68 year old male who is seen in consultation at the request of  Lela Gan PA-C for evaluation of right knee.     The patient is seen by themselves.    Onset: 1 month(s) ago. Reports insidious onset without acute precipitating event. [place], and getting in and out of fishing boats  Location of Pain: right knee, diffuse around the knee and into the right lower leg  Worsened by: Turning the leg in bed  Better with:   Treatments tried: Elevation and icing, compression sleeves  Associated symptoms: swelling, popping/clicking    Orthopedic/Surgical history: None for the right knee  Meniscal Repair on the left side 20+ years ago  Social History/Occupation: Retired, working on a cabin    **  Above information per rooming staff.  Additional history:  ***        REVIEW OF SYSTEMS:  Review of Systems    OBJECTIVE:  Ht 1.727 m (5' 8\")   Wt 94.3 kg (208 lb)   BMI 31.63 kg/m           Right Knee exam    Inspection:   *** effusion   *** ecchymosis    ROM: {FSOC ROM:990502}    Patellar Motion: {Patella motion:439997}    Tender: " {Tenderness:551720}    Non Tender: {non tender:739743}    Special Tests: {Special tests:934541}        RADIOLOGY:  Final results and radiologist's interpretation, available in the [facility] health record.  Images were reviewed with the patient in the office today.  My personal interpretation of the performed imaging: mild bilateral medial compartment narrowing. No acute bony abnormality noted. + right knee effusion.      ***    ========================    Previous US:    US Lower Extremity Venous Duplex Right    Narrative    EXAM: US LOWER EXTREMITY VENOUS DUPLEX RIGHT  LOCATION: [facility]  DATE: 5/16/2024    INDICATION: R knee pain swelling going into calf  COMPARISON: None.  TECHNIQUE: Venous Duplex ultrasound of the right lower extremity with and without compression, augmentation and duplex. Color flow and spectral Doppler with waveform analysis performed.    FINDINGS: Exam includes the common femoral, femoral, popliteal, and contralateral common femoral veins as well as segmentally visualized deep calf veins and greater saphenous vein.     RIGHT: No deep vein thrombosis. No superficial thrombophlebitis. 3.6 x 2.5 x 0.3 cm Baker's cyst is seen along the popliteal fossa.      Impression    IMPRESSION:  1.  No deep venous thrombosis in the right lower extremity.  2.  3.6 x 2.5 x 0.3 cm Baker's cyst within the right popliteal fossa.           {[facility] 2021 Documentation (Optional):521037}  {2021 E&M time (Optional):120729}  {Provider  Link to [facility] Help Grid :397383}         Again, thank you for allowing me to participate in the care of your patient.        Sincerely,        Adama Layne DO

## 2024-06-05 NOTE — PATIENT INSTRUCTIONS
X-rays of the right knee today demonstrate mild underlying degenerative change, or some arthritis in the knee.  This appears to coincide with the medial knee pain.  There is a joint effusion (knee joint swelling) on exam today, and the previous ultrasound evaluation demonstrated a Baker's cyst.  Swelling can subside with time.  For the knee, we discussed symptom management with icing, over-the-counter medication.  Activity modification reviewed.  Generally okay to be active if comfortable and able to do so.  No additional imaging required currently.  Discussed potential for rehab exercises, to keep the joint moving and muscle strong.  If interested in this, contact clinic.  For support, okay to continue with the compression that you have.  We also discussed potential for anti-inflammatory medications and steroid injection.  Defer these for now, with overall comfort level, plan for monitoring.  Ok to continue with monitoring, and we can leave follow-up open ended.    If you have any further questions for your physician or physician s care team you can contact them thru [employer] or by calling [phone].

## 2025-02-10 ENCOUNTER — NURSE TRIAGE (OUTPATIENT)
Dept: FAMILY MEDICINE | Facility: CLINIC | Age: 70
End: 2025-02-10
Payer: COMMERCIAL

## 2025-02-10 ENCOUNTER — ANCILLARY PROCEDURE (OUTPATIENT)
Dept: GENERAL RADIOLOGY | Facility: CLINIC | Age: 70
End: 2025-02-10
Payer: COMMERCIAL

## 2025-02-10 ENCOUNTER — OFFICE VISIT (OUTPATIENT)
Dept: URGENT CARE | Facility: URGENT CARE | Age: 70
End: 2025-02-10
Payer: COMMERCIAL

## 2025-02-10 VITALS
HEART RATE: 84 BPM | WEIGHT: 207.2 LBS | SYSTOLIC BLOOD PRESSURE: 158 MMHG | BODY MASS INDEX: 31.5 KG/M2 | OXYGEN SATURATION: 95 % | TEMPERATURE: 100 F | DIASTOLIC BLOOD PRESSURE: 118 MMHG | RESPIRATION RATE: 16 BRPM

## 2025-02-10 DIAGNOSIS — J01.90 ACUTE NON-RECURRENT SINUSITIS, UNSPECIFIED LOCATION: Primary | ICD-10-CM

## 2025-02-10 DIAGNOSIS — R05.1 ACUTE COUGH: ICD-10-CM

## 2025-02-10 PROCEDURE — 71046 X-RAY EXAM CHEST 2 VIEWS: CPT | Mod: TC | Performed by: RADIOLOGY

## 2025-02-10 PROCEDURE — 99213 OFFICE O/P EST LOW 20 MIN: CPT

## 2025-02-10 RX ORDER — DOXYCYCLINE HYCLATE 100 MG
100 TABLET ORAL 2 TIMES DAILY
Qty: 14 TABLET | Refills: 0 | Status: SHIPPED | OUTPATIENT
Start: 2025-02-10 | End: 2025-02-17

## 2025-02-10 RX ORDER — METOPROLOL SUCCINATE 100 MG/1
1 TABLET, EXTENDED RELEASE ORAL
COMMUNITY
Start: 2024-12-03

## 2025-02-10 RX ORDER — BENZONATATE 200 MG/1
200 CAPSULE ORAL 3 TIMES DAILY PRN
Qty: 21 CAPSULE | Refills: 0 | Status: SHIPPED | OUTPATIENT
Start: 2025-02-10

## 2025-02-10 RX ORDER — CITALOPRAM HYDROBROMIDE 40 MG/1
1 TABLET ORAL
COMMUNITY
Start: 2024-11-25

## 2025-02-10 NOTE — PATIENT INSTRUCTIONS
Chest x-ray shows the following per the radiologist report: Moderate hiatal hernia which has an air-fluid level. The hernia has mass effect on the medial left lower lobe. Cardiac silhouette is normal in size. Vascular pedicle is normal. Normal hilar contours and lung vascularity.     The lungs are symmetrically inflated. No airspace or interstitial lung opacities. No pleural effusion.    Take the benzonatate as prescribed for the cough.  Take the antibiotic as prescribed and finish the full course even if symptoms improve.  Try yogurt with active cultures or probiotics such as Culturelle daily to help prevent diarrhea while using antibiotics.  Follow-up with your primary care provider should symptoms persist and to discuss your elevated blood pressure and your hiatal hernia.

## 2025-02-10 NOTE — TELEPHONE ENCOUNTER
"Reason for Disposition   Wheezing is present    Additional Information   Negative: Bluish (or gray) lips or face   Negative: SEVERE difficulty breathing (e.g., struggling for each breath, speaks in single words)   Negative: Rapid onset of cough and has hives   Negative: Coughing started suddenly after medicine, an allergic food or bee sting   Negative: Difficulty breathing after exposure to flames, smoke, or fumes   Negative: Sounds like a life-threatening emergency to the triager   Negative: Previous asthma attacks and this feels like asthma attack   Negative: Dry cough (non-productive; no sputum or minimal clear sputum) and within 14 days of COVID-19 Exposure   Negative: MODERATE difficulty breathing (e.g., speaks in phrases, SOB even at rest, pulse 100-120) and still present when not coughing   Negative: Chest pain present when not coughing   Negative: Passed out (e.g., fainted, lost consciousness, blacked out and was not responding)   Negative: Patient sounds very sick or weak to the triager   Negative: MILD difficulty breathing (e.g., minimal/no SOB at rest, SOB with walking, pulse <100) and still present when not coughing   Negative: Coughed up > 1 tablespoon (15 ml) blood (Exception: Blood-tinged sputum.)   Negative: Fever > 103 F (39.4 C)   Negative: Fever > 101 F (38.3 C) and over 60 years of age   Negative: Fever > 100 F (37.8 C) and has diabetes mellitus or a weak immune system (e.g., HIV positive, cancer chemotherapy, organ transplant, splenectomy, chronic steroids)   Negative: Fever > 100 F (37.8 C) and bedridden (e.g., CVA, chronic illness, recovering from surgery)   Negative: Increasing ankle swelling    Answer Assessment - Initial Assessment Questions  1. ONSET: \"When did the cough begin?\"       5 days ago   2. SEVERITY: \"How bad is the cough today?\"       \"Pretty bad.\"  3. SPUTUM: \"Describe the color of your sputum\" (e.g., none, dry cough; clear, white, yellow, green)      green  4. HEMOPTYSIS: \"Are " "you coughing up any blood?\" If Yes, ask: \"How much?\" (e.g., flecks, streaks, tablespoons, etc.)      Denies, blood with blowing nose   5. DIFFICULTY BREATHING: \"Are you having difficulty breathing?\" If Yes, ask: \"How bad is it?\" (e.g., mild, moderate, severe)       Denies   6. FEVER: \"Do you have a fever?\" If Yes, ask: \"What is your temperature, how was it measured, and when did it start?\"      Denies, has not taken temperature   7. CARDIAC HISTORY: \"Do you have any history of heart disease?\" (e.g., heart attack, congestive heart failure)       Yes , MI stents placed 12-13 years ago   8. LUNG HISTORY: \"Do you have any history of lung disease?\"  (e.g., pulmonary embolus, asthma, emphysema)      Denies   9. PE RISK FACTORS: \"Do you have a history of blood clots?\" (or: recent major surgery, recent prolonged travel, bedridden)      Denies   10. OTHER SYMPTOMS: \"Do you have any other symptoms?\" (e.g., runny nose, wheezing, chest pain)        Runny nose, \"little bit\" of wheezing. Denies chest pain and shortness of breath.  11. PREGNANCY: \"Is there any chance you are pregnant?\" \"When was your last menstrual period?\"        N/A  12. TRAVEL: \"Have you traveled out of the country in the last month?\" (e.g., travel history, exposures)        no    Protocols used: Cough-A-OH  Pt [facility] with [facility]. He agrees to be seen today in urgent care.     Radha Casarez RN on 2/10/2025 at 8:19 AM   "

## 2025-02-10 NOTE — PROGRESS NOTES
ASSESSMENT:  (J01.90) Acute non-recurrent sinusitis, unspecified location  (primary encounter diagnosis)  Plan: doxycycline hyclate (VIBRA-TABS) 100 MG tablet    (R05.1) Acute cough  Plan: XR Chest 2 Views, benzonatate (TESSALON) 200 MG        capsule    PLAN:  Informed the patient that the chest x-ray shows the following per the radiologist report: Moderate hiatal hernia which has an air-fluid level. The hernia has mass effect on the medial left lower lobe. Cardiac silhouette is normal in size. Vascular pedicle is normal. Normal hilar contours and lung vascularity.  The lungs are symmetrically inflated. No airspace or interstitial lung opacities. No pleural effusion.  The patient indicates that he is aware of the hiatal hernia.  We discussed the need to take the benzonatate as prescribed for the cough.  We also discussed taking the antibiotic as prescribed and finishing the full course even if symptoms improve.  Antibiotic prescribed for the patient given his sinus pain/pressure with green mucus production and fever persisting for 5 days in the absence of pneumonia as evidenced by the chest x-ray.  Informed the patient to try yogurt with active cultures or probiotic such as Culturelle daily to help find diarrhea while taking the antibiotic.  Informed the patient to follow-up with his primary care provider should symptoms persist and to discuss his elevated blood pressure and his hiatal hernia.  Patient acknowledged his understanding of the above plan.    The use of Dragon/Auto Mute dictation services may have been used to construct the content in this note; any grammatical or spelling errors are non-intentional. Please contact the author of this note directly if you are in need of any clarification.      CHARLES Gilbert CNP      SUBJECTIVE:  Nir Mendenhall is a 69 year old male who presents to the clinic today with a chief complaint of cough, sinus pain/pressure and fever for 5 day(s).  His cough is  described as productive green.    The patient's symptoms are severe and not changing over the course of time.  Associated symptoms include rhinorrhea and sore throat. The patient's symptoms are exacerbated by lying down  Patient has been using fluids and Tylenol to improve symptoms.    ROS  Negative except noted above.     OBJECTIVE:  BP (!) 157/108 (BP Location: Left arm, Patient Position: Sitting, Cuff Size: Adult Regular)   Pulse 84   Temp 100  F (37.8  C) (Tympanic)   Resp 16   Wt 94 kg (207 lb 3.2 oz)   SpO2 95%   BMI 31.50 kg/m    GENERAL APPEARANCE: healthy, alert and no distress  EYES: EOMI,  PERRL, conjunctiva clear  HENT: nose and mouth without erythema, ulcers or lesions and oral mucous membranes moist, no erythema noted  NECK: supple, nontender, no lymphadenopathy  RESP: lungs clear to auscultation - no rales, rhonchi or wheezes  CV: regular rates and rhythm, normal S1 S2, no murmur noted  SKIN: no suspicious lesions or rashes    X-RAY: Chest x-ray shows the following per the radiologist report: Moderate hiatal hernia which has an air-fluid level. The hernia has mass effect on the medial left lower lobe. Cardiac silhouette is normal in size. Vascular pedicle is normal. Normal hilar contours and lung vascularity.     The lungs are symmetrically inflated. No airspace or interstitial lung opacities. No pleural effusion.